# Patient Record
Sex: MALE | Race: WHITE | URBAN - METROPOLITAN AREA
[De-identification: names, ages, dates, MRNs, and addresses within clinical notes are randomized per-mention and may not be internally consistent; named-entity substitution may affect disease eponyms.]

---

## 2018-05-21 ENCOUNTER — EMERGENCY (EMERGENCY)
Facility: HOSPITAL | Age: 78
LOS: 1 days | Discharge: HOME | End: 2018-05-21
Admitting: EMERGENCY MEDICINE
Payer: MEDICARE

## 2018-05-21 VITALS
SYSTOLIC BLOOD PRESSURE: 179 MMHG | TEMPERATURE: 98 F | RESPIRATION RATE: 16 BRPM | HEART RATE: 98 BPM | DIASTOLIC BLOOD PRESSURE: 82 MMHG | OXYGEN SATURATION: 97 %

## 2018-05-21 PROCEDURE — 99284 EMERGENCY DEPT VISIT MOD MDM: CPT

## 2018-05-21 PROCEDURE — 76882 US LMTD JT/FCL EVL NVASC XTR: CPT | Mod: 26,LT

## 2018-05-21 NOTE — ED PROVIDER NOTE - MEDICAL DECISION MAKING DETAILS
pt with hematoma x 8 days and reports is actually smaller and less tender than original injury.  FROM and no s/s of compartment syndrome. will give surgical f/u. advised arnica and return precautions discussed.  no overlying or concern for cellulitis or infection of area  US shows hematoma-for pt to take for her f/u appointment

## 2018-05-21 NOTE — ED ADULT NURSE NOTE - OBJECTIVE STATEMENT
pt presents to ED for bruising to back of left leg after mechanical fall. sent in from PMD for further evaluation. pt is able to ambulate with steady gait with cane. pt in NAD, A,A&Ox3 and will continue to monitor.

## 2018-05-21 NOTE — ED PROVIDER NOTE - SKIN, MLM
Skin normal color for race, warm, dry and intact. No evidence of rash. + large left lateral leg bruising and aprox 6x2cm hematoma to left lateral mid thigh; no s/s of compartment syndrome.
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
## 2018-05-25 DIAGNOSIS — S70.12XA CONTUSION OF LEFT THIGH, INITIAL ENCOUNTER: ICD-10-CM

## 2018-05-25 DIAGNOSIS — M25.552 PAIN IN LEFT HIP: ICD-10-CM
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
